# Patient Record
Sex: FEMALE | Race: WHITE | NOT HISPANIC OR LATINO | Employment: UNEMPLOYED | URBAN - METROPOLITAN AREA
[De-identification: names, ages, dates, MRNs, and addresses within clinical notes are randomized per-mention and may not be internally consistent; named-entity substitution may affect disease eponyms.]

---

## 2022-08-23 ENCOUNTER — HOSPITAL ENCOUNTER (EMERGENCY)
Facility: HOSPITAL | Age: 57
Discharge: HOME | End: 2022-08-23
Attending: EMERGENCY MEDICINE | Admitting: EMERGENCY MEDICINE

## 2022-08-23 VITALS
BODY MASS INDEX: 21.34 KG/M2 | DIASTOLIC BLOOD PRESSURE: 83 MMHG | TEMPERATURE: 98.6 F | WEIGHT: 125 LBS | SYSTOLIC BLOOD PRESSURE: 129 MMHG | RESPIRATION RATE: 18 BRPM | HEIGHT: 64 IN | OXYGEN SATURATION: 96 % | HEART RATE: 128 BPM

## 2022-08-23 DIAGNOSIS — M54.31 SCIATICA OF RIGHT SIDE: Primary | ICD-10-CM

## 2022-08-23 PROCEDURE — 99282 EMERGENCY DEPT VISIT SF MDM: CPT

## 2022-08-23 RX ORDER — METHYLPREDNISOLONE 4 MG/1
TABLET ORAL
Qty: 21 TABLET | Refills: 0 | Status: SHIPPED | OUTPATIENT
Start: 2022-08-23 | End: 2022-12-19

## 2022-08-23 NOTE — ED PROVIDER NOTES
Subjective   56-year-old female presents emergency department today with pain in her right buttock area.  Patient reports has had this for years has had injections as recently as May.  She reports that she been having chronic pain that radiates down to her foot.  She denies any loss of bowel or bladder control.  No loss of strength.  The pain is better with the right leg straight.  Sitting seems to exacerbate her pain.  She had been going to pain management done in [facility].  She reports that she had no abdominal pain denies any dysuria frequency urgency or hematuria.  No melena hematochezia or hematemesis.      History provided by:  Patient   used: No    Back Pain  Location:  Lumbar spine  Quality:  Aching and burning  Radiates to:  R posterior upper leg, R thigh and R foot  Pain severity:  Severe  Pain is:  Same all the time  Onset quality:  Gradual  Timing:  Constant  Progression:  Unchanged  Chronicity:  New  Context: not emotional stress, not jumping from heights, not lifting heavy objects, not occupational injury, not pedestrian accident, not recent illness, not recent injury and not twisting    Worsened by:  Nothing  Ineffective treatments:  None tried  Associated symptoms: no abdominal pain, no bladder incontinence, no bowel incontinence, no chest pain, no dysuria, no fever, no headaches, no leg pain, no numbness, no paresthesias, no perianal numbness, no tingling, no weakness and no weight loss    Risk factors: no hx of cancer, no lack of exercise, not obese, not pregnant and no steroid use        Review of Systems   Constitutional: Negative for chills, fever and weight loss.   Respiratory: Negative for chest tightness, shortness of breath and wheezing.    Cardiovascular: Negative for chest pain and palpitations.   Gastrointestinal: Negative for abdominal pain and bowel incontinence.   Genitourinary: Negative for bladder incontinence, dysuria, frequency and urgency.    Musculoskeletal: Positive for back pain. Negative for neck pain.   Skin: Negative for pallor and rash.   Neurological: Negative for tingling, weakness, numbness, headaches and paresthesias.   Hematological: Negative for adenopathy.   Psychiatric/Behavioral: Negative.    All other systems reviewed and are negative.      No past medical history on file.    Allergies   Allergen Reactions   • Erythromycin GI Intolerance       No past surgical history on file.    No family history on file.    Social History     Socioeconomic History   • Marital status:            Objective   Physical Exam  Vitals and nursing note reviewed.   Constitutional:       General: She is not in acute distress.     Appearance: She is well-developed. She is not diaphoretic.   HENT:      Head: Normocephalic and atraumatic.      Nose: Nose normal.   Eyes:      General: No scleral icterus.     Conjunctiva/sclera: Conjunctivae normal.   Pulmonary:      Effort: Pulmonary effort is normal. No respiratory distress.   Musculoskeletal:         General: Normal range of motion.      Cervical back: Normal range of motion and neck supple.      Comments: Tenderness in the right SI.  She reports plus one of the knees +1 the ankles.  Heel toe strength 5/5.   Skin:     General: Skin is warm and dry.   Neurological:      Mental Status: She is alert and oriented to person, place, and time.   Psychiatric:         Behavior: Behavior normal.         Procedures           ED Course  ED Course as of 08/24/22 2314   Tue Aug 23, 2022   1223 She is mostly wanting referral to neurosurgery. [HESHAM]      ED Course User Index  [HESHAM] Arnulfo Duque PA                                           MDM  Number of Diagnoses or Management Options  Sciatica of right side: new and requires workup     Amount and/or Complexity of Data Reviewed  Discuss the patient with other providers: yes    Patient Progress  Patient progress: stable      Final diagnoses:   Sciatica of right side        ED Disposition  ED Disposition     ED Disposition   Discharge    Condition   Stable    Comment   --             Nichole Ponce, APRN  [address] [facility]  [address]  [place] [address]  [phone]          Fabrizio Houser MD  [address] [place] [address]  [phone]      Call for appointment         Medication List      New Prescriptions    methylPREDNISolone 4 MG dose pack  Commonly known as: MEDROL  Take as directed on package instructions.           Where to Get Your Medications      These medications were sent to [address] - [address] AT [facility] ([address] - [phone]  - [phone]   [address] [facility], [place] [address]    Phone: [phone]   · methylPREDNISolone 4 MG dose pack          Arnulfo Duque PA  08/24/22 5570

## 2022-12-19 ENCOUNTER — OFFICE VISIT (OUTPATIENT)
Dept: NEUROSURGERY | Facility: CLINIC | Age: 57
End: 2022-12-19

## 2022-12-19 VITALS
SYSTOLIC BLOOD PRESSURE: 118 MMHG | WEIGHT: 120 LBS | TEMPERATURE: 97.5 F | BODY MASS INDEX: 20.49 KG/M2 | HEIGHT: 64 IN | DIASTOLIC BLOOD PRESSURE: 70 MMHG

## 2022-12-19 DIAGNOSIS — Z72.0 TOBACCO USE: ICD-10-CM

## 2022-12-19 DIAGNOSIS — G89.29 CHRONIC MIDLINE LOW BACK PAIN WITH RIGHT-SIDED SCIATICA: ICD-10-CM

## 2022-12-19 DIAGNOSIS — M54.31 SCIATICA OF RIGHT SIDE: Primary | ICD-10-CM

## 2022-12-19 DIAGNOSIS — M54.41 CHRONIC MIDLINE LOW BACK PAIN WITH RIGHT-SIDED SCIATICA: ICD-10-CM

## 2022-12-19 PROBLEM — M54.50 LOW BACK PAIN: Status: ACTIVE | Noted: 2022-04-01

## 2022-12-19 PROCEDURE — 99204 OFFICE O/P NEW MOD 45 MIN: CPT | Performed by: PHYSICIAN ASSISTANT

## 2022-12-19 RX ORDER — GABAPENTIN 600 MG/1
TABLET ORAL
COMMUNITY
Start: 2022-10-27

## 2022-12-19 RX ORDER — MEMANTINE HYDROCHLORIDE 10 MG/1
TABLET ORAL
COMMUNITY
Start: 2022-09-27

## 2022-12-19 RX ORDER — DULOXETIN HYDROCHLORIDE 60 MG/1
CAPSULE, DELAYED RELEASE ORAL
COMMUNITY
Start: 2022-09-27

## 2022-12-19 RX ORDER — HYDROCODONE BITARTRATE AND ACETAMINOPHEN 7.5; 325 MG/1; MG/1
TABLET ORAL
COMMUNITY
Start: 2022-11-22

## 2022-12-19 RX ORDER — TRAZODONE HYDROCHLORIDE 50 MG/1
TABLET ORAL
COMMUNITY
Start: 2022-12-18

## 2022-12-19 RX ORDER — LEVETIRACETAM 750 MG/1
TABLET ORAL
COMMUNITY
Start: 2022-11-14

## 2022-12-19 NOTE — PROGRESS NOTES
Patient: Iesha Alfaro  : 1965  Chart #: [id number]    Date of Service: 2022    Chief Complaint   Patient presents with   • Back Pain     Bilateral LBP into bilateral buttocks   • Leg Pain     RLE   • Numbness     RLE   • Extremity Weakness       HPI  Is a 57-year-old female who presents with 3 years of pain in the lower back, sacrum and down the right posterior thigh into the lateral foot, great toe and heel.  [place] and has had pain ever since.  Her pain is worse with ambulating, she says the pain goes up her leg and back down again.  She has difficulty sitting due to coccyx pain.  She has been to pain management and had injections in  which were not helpful, she now sees Dr. Isabel and Dr. Branch and will see them today to talk about a spinal cord stimulator trial.  The patient presents with an MRI from May 2022 for us to review.    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  Ongoing tobacco use.  Chronic pain, right leg and knee from prior accident at the age of 21.  Past Medical History:   Diagnosis Date   • Brain edema (HCC)    • Low back pain -    Pain out of control   • Lumbosacral disc disease -   • Seizures (HCC)          Past Surgical History:   Procedure Laterality Date   • CHOLECYSTECTOMY     • FEMUR SURGERY         Allergies   Allergen Reactions   • Erythromycin GI Intolerance   • Tramadol Other (See Comments)         Current Outpatient Medications:   •  DULoxetine (CYMBALTA) 60 MG capsule, , Disp: , Rfl:   •  gabapentin (NEURONTIN) 600 MG tablet, , Disp: , Rfl:   •  HYDROcodone-acetaminophen (NORCO) 7.5-325 MG per tablet, , Disp: , Rfl:   •  levETIRAcetam (KEPPRA) 750 MG tablet, , Disp: , Rfl:   •  memantine (NAMENDA) 10 MG tablet, , Disp: , Rfl:   •  traZODone (DESYREL) 50 MG tablet, , Disp: , Rfl:     Social History     Socioeconomic History   • Marital status:    Tobacco Use   •  Smoking status: Every Day     Packs/day: 1.00     Years: 40.00     Pack years: 40.00     Types: Cigarettes   Substance and Sexual Activity   • Alcohol use: Yes     Alcohol/week: 10.0 standard drinks     Types: 10 Shots of liquor per week   • Drug use: Not Currently     Types: Hydrocodone, Marijuana   • Sexual activity: Yes     Partners: Male       Family History   Problem Relation Age of Onset   • Cancer Father        BMI is within normal parameters. No other follow-up for BMI required.       Social History    Tobacco Use      Smoking status: Every Day        Packs/day: 1.00        Years: 40.00        Pack years: 40        Types: Cigarettes      Smokeless tobacco: Not on file       Review of Systems   Constitutional: Positive for appetite change and unexpected weight change. Negative for activity change, chills, diaphoresis, fatigue and fever.   HENT: Negative for congestion, dental problem, drooling, ear discharge, ear pain, facial swelling, hearing loss, mouth sores, nosebleeds, postnasal drip, rhinorrhea, sinus pressure, sneezing, sore throat, tinnitus, trouble swallowing and voice change.    Eyes: Negative for photophobia, pain, discharge, redness, itching and visual disturbance.   Respiratory: Negative for apnea, cough, choking, chest tightness, shortness of breath, wheezing and stridor.    Cardiovascular: Negative for chest pain, palpitations and leg swelling.   Gastrointestinal: Negative for abdominal distention, abdominal pain, anal bleeding, blood in stool, constipation, diarrhea, nausea, rectal pain and vomiting.   Endocrine: Negative for cold intolerance, heat intolerance, polydipsia, polyphagia and polyuria.   Genitourinary: Negative for decreased urine volume, difficulty urinating, dysuria, enuresis, flank pain, frequency, genital sores, hematuria and urgency.   Musculoskeletal: Positive for back pain. Negative for arthralgias, gait problem, joint swelling, myalgias, neck pain and neck stiffness.  "  Skin: Negative for color change, pallor, rash and wound.   Allergic/Immunologic: Negative for environmental allergies, food allergies and immunocompromised state.   Neurological: Positive for seizures, weakness and numbness. Negative for dizziness, tremors, syncope, facial asymmetry, speech difficulty, light-headedness and headaches.   Hematological: Negative for adenopathy. Does not bruise/bleed easily.   Psychiatric/Behavioral: Negative for agitation, behavioral problems, confusion, decreased concentration, dysphoric mood, hallucinations, self-injury, sleep disturbance and suicidal ideas. The patient is not nervous/anxious and is not hyperactive.    All other systems reviewed and are negative.       Physical examination:  Blood pressure 118/70, temperature 97.5 °F (36.4 °C), temperature source Infrared, height 162.6 cm (64\"), weight 54.4 kg (120 lb).  HEENT- normocephalic, atraumatic, sclera clear  Lungs-normal expansion, no wheezing  Heart-regular rate and rhythm  Extremities-positive pulses, no edema    Neurologic Exam  WDWN WF in NAD  A/A/C, speech clear, attention normal, conversant, answers questions appropriately, good historian.  Cranial nerves II through XII are intact.  Motor examination does not reveal weakness in the lower extremities.   Sensation is intact.  Gait is abnormal, she favors the right leg, she puts weight on the forefoot, she has a brace on her right knee balance is normal.   No tremors are noted.  Reflexes are intact.  Straight leg raising is positive on the right.  Martines is negative. Clonus is negative.   Palpation of the lumbar spine and right SI joint is significantly tender.    Radiographic Imaging:  For my review is an MRI of the lumbar spine from May 2022 which shows normal alignment, there is mild degenerative changes at L2-3 without foraminal narrowing.  There is a leftward disc protrusion at L4-5.  Mild degenerative changes at L5-S1 without foraminal narrowing or nerve root " compression.    Medical Decision Making  Diagnoses and all orders for this visit:    1. Sciatica of right side (Primary)    2. Chronic midline low back pain with right-sided sciatica    3. Tobacco use    57-year-old female with chronic low back pain and coccyx pain along with right sided sciatica for 3 years or longer.  She has had injections this summer without improvement she is now seeing Dr. Manzo and Dr. Branch [facility] and had an injection approximately 1 month ago without any improvement.  She has a follow-up appointment with him this afternoon to discuss spinal cord stimulator trial.   Surgical intervention is not an option for the patient given the findings on MRI scan.  She has been to physical therapy without improvement, she has had injections without improvement unfortunately there is no evidence of nerve root compression on the right.  It was a pleasure providing neurosurgical evaluation.       Meghan Chen, PAC    Patient Care Team:  Nichole Ponce APRN as PCP - General (Nurse Practitioner)

## 2022-12-19 NOTE — PROGRESS NOTES
Patient: Iesha Alfaro  : 1965  Chart #: [id number]    Date of Service: 2022    No chief complaint on file.      HPI  ***    Chronic Illnesses:    No past medical history on file.      No past surgical history on file.    Allergies   Allergen Reactions   • Erythromycin GI Intolerance         Current Outpatient Medications:   •  methylPREDNISolone (MEDROL) 4 MG dose pack, Take as directed on package instructions., Disp: 21 tablet, Rfl: 0    Social History     Socioeconomic History   • Marital status:        No family history on file.    BMI is within normal parameters. No other follow-up for BMI required.       Social History    Tobacco Use      Smoking status: Not on file      Smokeless tobacco: Not on file       Review of Systems     Gait & Balance Assessment:  Risk assessment for falls. Fall precautions:  such as;   • Using gait aids a cane, walker at the appropriate height at all times for ambulation or if necessary a wheelchair  • Removing all area rugs and coffee tables to create a safe environment at home  • Ensure clean, dry floors  • Wearing supportive footwear and properly fitting clothing  • Ensure bed/chair is appropriate height and patient's feet can touch the floor  • Using a shower transfer bench  • Using walk-in shower and having shower safety bars installed  • Ensure proper lighting, minimize glare  • Have nightlights operational and in use  • Participation in an exercise program for gait training, balance training and strength  • Avoid carrying laundry up and down steps  • Ensure proper compliance and organization of medications to avoid errors   • Avoid use of over the counter sedatives and alcohol consumption  • Ensure easy access to call bell, glasses, TV control, telephone  • Ensure glasses/hearing aids are in use or close by (on top of night table)     Physical examination:  There were no vitals taken for this visit.  HEENT- normocephalic, atraumatic, sclera  clear  Lungs-normal expansion, no wheezing  Heart-regular rate and rhythm  Extremities-positive pulses, no edema    Neurologic Exam  WDWN***  A/A/C, speech clear, attention normal, conversant, answers questions appropriately, good historian.  Cranial nerves II through XII are intact.  Motor examination does not reveal weakness in the , upper or lower extremities.   Sensation is intact.  Gait is normal, balance is normal.   No tremors are noted.  Reflexes are intact.   Martines is negative. Clonus is negative.   Palpation ***    Radiographic Imaging:  For my review ***    Medical Decision Making  There are no diagnoses linked to this encounter.          Meghan Chen, PAC    Patient Care Team:  Nichole Ponce APRN as PCP - General (Nurse Practitioner)

## 2022-12-20 ENCOUNTER — PATIENT ROUNDING (BHMG ONLY) (OUTPATIENT)
Dept: NEUROSURGERY | Facility: CLINIC | Age: 57
End: 2022-12-20

## 2022-12-20 NOTE — PROGRESS NOTES
A MY-CHART MESSAGE HAS BEEN SENT TO THE PATIENT FOR PATIENT ROUNDING WITH [facility].